# Patient Record
Sex: MALE | Race: BLACK OR AFRICAN AMERICAN | Employment: UNEMPLOYED | URBAN - METROPOLITAN AREA
[De-identification: names, ages, dates, MRNs, and addresses within clinical notes are randomized per-mention and may not be internally consistent; named-entity substitution may affect disease eponyms.]

---

## 2019-08-29 ENCOUNTER — HOSPITAL ENCOUNTER (OUTPATIENT)
Dept: OCCUPATIONAL THERAPY | Age: 24
Setting detail: THERAPIES SERIES
Discharge: HOME | End: 2019-08-29
Payer: COMMERCIAL

## 2019-08-29 PROCEDURE — 97165 OT EVAL LOW COMPLEX 30 MIN: CPT

## 2019-08-29 PROCEDURE — 97537 COMMUNITY/WORK REINTEGRATION: CPT

## 2019-08-29 NOTE — PROGRESS NOTES
left, traffic lights, stop signs, speed limit signs and brake lights and turn signals of the vehicle directly in front. Visual Searching Skills:   Overall visual search skill in a timely manner is: For example: Identified stimuli in a highly clustered area:  _______% of accuracy    Executive Skills:     Luige Ludwig 8 with anticipating that brakes would be applied. INDEPENDENT   NEEDS REINFORCING with anticipating side streets at traffic lights and stop signs. INDEPENDENT   NEEDS REINFORCING with distinguishing side streets from parking lots. Judging Gaps in Traffic:  Ask when it is safe to proceed when making right hand turns and left hand turns. Accuracy  COMPETENT   NEED REINFORCING     Demonstrated skills for speed of search and processing  COMPETENT   NEED REINFORCING     Skills for being attentive to line of sight restrictions COMPETENT   NEED REINFORCING    Problem Solving:   Ask if they have ever been in a vehicle when the  had an emergency breakdown. The response steps that were identified were:_____________________________________________________  Pretend that our car has a flat tire. The response steps identified were:___________and  DID   DID NOT incorporate the environment. Cues were needed for:___________________________  Ask how it would respond if they were alone. Response steps were identified were:_____________________________    Mental Stamina:* Patient demonstrated good mental stamina in that they were able to attend to the vehicle activities for approximately ____ hours. CLIENT'S STATED GOAL: *To learn to drive. RECOMMENDATIONS: * Continue assessment with passenger activities next visit.   Demonstrates strong potential.  ALBERTO Acosta/SIVA, MHS, CDRS  Certified  Rehabilitation Specialist  8/29/2019  3:11 PM